# Patient Record
Sex: FEMALE | Race: ASIAN | NOT HISPANIC OR LATINO | URBAN - METROPOLITAN AREA
[De-identification: names, ages, dates, MRNs, and addresses within clinical notes are randomized per-mention and may not be internally consistent; named-entity substitution may affect disease eponyms.]

---

## 2023-02-20 ENCOUNTER — OFFICE (OUTPATIENT)
Dept: URBAN - METROPOLITAN AREA CLINIC 76 | Facility: CLINIC | Age: 23
End: 2023-02-20
Payer: MEDICAID

## 2023-02-20 VITALS
DIASTOLIC BLOOD PRESSURE: 77 MMHG | HEIGHT: 62 IN | SYSTOLIC BLOOD PRESSURE: 104 MMHG | HEART RATE: 74 BPM | WEIGHT: 193 LBS

## 2023-02-20 DIAGNOSIS — R10.13 EPIGASTRIC PAIN: ICD-10-CM

## 2023-02-20 DIAGNOSIS — R19.4 CHANGE IN BOWEL HABIT: ICD-10-CM

## 2023-02-20 DIAGNOSIS — K59.00 CONSTIPATION, UNSPECIFIED: ICD-10-CM

## 2023-02-20 PROCEDURE — 99203 OFFICE O/P NEW LOW 30 MIN: CPT | Performed by: INTERNAL MEDICINE

## 2023-02-20 NOTE — SERVICEHPINOTES
Ms. Guzman is a 23-year-old female with autism.  The information was obtained from her mother.  She tells me that her daughter currently has no GI complaints.  She says that sometimes her diet is poor and she can have some constipation, she also says she is "a fast eater" and sometimes if she overeats then she has to go to the bathroom fairly soon but she says it's not diarrhea.  She has no history of reflux, there is no dysphagia, odynophagia nausea or vomiting per her mother.  Her mother herself has a history of peptic ulcer disease.  The patient's not a smoker or drinker.  She is in no acute distress.  Her mother said she just wanted to get in to see someone in case her daughter has GI issues and she needs an appointment quickly.